# Patient Record
Sex: FEMALE | Race: WHITE
[De-identification: names, ages, dates, MRNs, and addresses within clinical notes are randomized per-mention and may not be internally consistent; named-entity substitution may affect disease eponyms.]

---

## 2021-12-30 ENCOUNTER — HOSPITAL ENCOUNTER (EMERGENCY)
Dept: HOSPITAL 50 - VM.ED | Age: 26
Discharge: HOME | End: 2021-12-30
Payer: COMMERCIAL

## 2021-12-30 DIAGNOSIS — Z20.822: ICD-10-CM

## 2021-12-30 DIAGNOSIS — J10.1: Primary | ICD-10-CM

## 2021-12-30 DIAGNOSIS — Z88.1: ICD-10-CM

## 2021-12-30 LAB
RSV RNA UPPER RESP QL NAA+PROBE: NEGATIVE
SARS-COV-2 RNA RESP QL NAA+PROBE: NEGATIVE

## 2021-12-30 NOTE — EDM.PDOC
ED HPI GENERAL MEDICAL PROBLEM





- General


Stated Complaint: BODY ACHES


Time Seen by Provider: 12/30/21 19:55


Source of Information: Reports: Patient


History Limitations: Reports: No Limitations





- History of Present Illness


INITIAL COMMENTS - FREE TEXT/NARRATIVE: 





Patient comes emergency department today from home with complaints of 

generalized body aches cough congestion sinus drainage and congestion.  This 

patient who is not Covid vaccine for influenza A.  The last day or so she has 

has generalized body aches.  No fatigue no arthralgias.  No headache no fever no

chills.  She has had little bit of chest congestion without a cough.  Cough is 

dry hacking nonproductive.  No sore throat.  No difficulty swallowing.  No 

abdominal pain nausea or vomiting.  No hematuria dysuria urinary frequency.  No 

black tarry stools or diarrhea.  No rash sores or lesions.  She has been eating 

and drinking appropriately.  She has had quite a bit of nasal drainage as well 

as congestion.  No pressure or pain in her sinuses.





- Related Data


                                    Allergies











Allergy/AdvReac Type Severity Reaction Status Date / Time


 


cefixime [From Suprax] Allergy  Other Verified 12/30/21 20:14


 


clindamycin Allergy  Other Verified 12/30/21 20:14














ED ROS GENERAL





- Review of Systems


Review Of Systems: Comprehensive ROS is negative, except as noted in HPI.





ED EXAM, GENERAL





- Physical Exam


Exam: See Below


Exam Limited By: No Limitations


General Appearance: Alert, WD/WN, No Apparent Distress


Eye Exam: Bilateral Eye: EOMI, PERRL


Ears: Normal External Exam, Normal TMs


Nose: Other (Turbinates bilaterally are pale not boggy.  There is quite a bit of

thick stringy white mucus within the sinus cavity.  No exudate or erythema.).  

No: Nasal Flaring


Throat/Mouth: Normal Inspection, Normal Lips, Normal Teeth, Normal Gums, Normal 

Oropharynx, Normal Voice, No Airway Compromise


Head: Atraumatic, Normocephalic


Neck: Normal Inspection, Supple, Non-Tender, Full Range of Motion


Respiratory/Chest: No Respiratory Distress, Lungs Clear, Normal Breath Sounds, 

No Accessory Muscle Use, Chest Non-Tender


Cardiovascular: Normal Peripheral Pulses, Regular Rate, Rhythm


GI/Abdominal: Normal Bowel Sounds, Soft, Non-Tender


 (Female) Exam: Deferred


Rectal (Female) Exam: Deferred


Back Exam: Normal Inspection


Extremities: Normal Inspection, No Pedal Edema


Neurological: Alert, Oriented, CN II-XII Intact, Normal Cognition, No 

Motor/Sensory Deficits


Psychiatric: Normal Affect, Normal Mood


Skin Exam: Warm, Dry, Intact, Normal Color





Course





- Vital Signs


Last Recorded V/S: 


                                Last Vital Signs











Temp  98.2 F   12/30/21 20:09


 


Pulse  93   12/30/21 20:09


 


Resp  19   12/30/21 20:09


 


BP  146/76 H  12/30/21 20:09


 


Pulse Ox  98   12/30/21 20:09














- Orders/Labs/Meds


Labs: 


                                Laboratory Tests











  12/30/21 Range/Units





  19:55 


 


Influenza Type A RNA  Positive H  (NEGATIVE)  


 


RSV RNA ([facility])  Negative  (NEGATIVE)  


 


Influenza Type B RNA  Negative  (NEGATIVE)  


 


SARS-CoV-2 RNA ([facility])  Negative  (NEGATIVE)  











Meds: 


Medications














Discontinued Medications














Generic Name Dose Route Start Last Admin





  Trade Name Charmaine  PRN Reason Stop Dose Admin


 


Acetaminophen  1,000 mg  12/30/21 20:59 





  Acetaminophen 500 Mg Tab  PO  12/30/21 21:00 





  ONETIME ONE  














- Re-Assessments/Exams


Free Text/Narrative Re-Assessment/Exam: 





Influenza positive. 





Tylenol as she developed a fever while she was here. 





I instructed the patient that she has influenza A.  Symptomatic management is 

paramount at this time.  I also advised that yearly flu vaccination is very 

important as well.  There is no indication at this time for Tamiflu for this low

risk patient.  We will discharge her home.  Return if anything new or worse.





Departure





- Departure


Time of Disposition: 20:59


Disposition: Home, Self-Care 01


Clinical Impression: 


 Influenza A








- Discharge Information


Instructions:  Influenza, Adult, Easy-to-Read, Fever, Adult, Easy-to-Read


Referrals: 


PCP,None [Primary Care Provider] - 


Forms:  ED Department Discharge, ED Return to Work/School Form


Additional Instructions: 


Stay home with influenza until fever free 48 hrs. 


Tylenol and or Ibuprofen as needed for pain fever discomfort. 


Push oral fluids as much as possible. If you are not urinating every 2 hours you

are not drinking enough fluids. 


Eat small frequent meals. 


Vit D OTC daily. 


Flonase OTC for nasal congestion and drainage. 


Return if new or worsening symptoms. 


Follow up with PCP this week if concerns.